# Patient Record
Sex: FEMALE | Race: WHITE | HISPANIC OR LATINO | Employment: OTHER | URBAN - METROPOLITAN AREA
[De-identification: names, ages, dates, MRNs, and addresses within clinical notes are randomized per-mention and may not be internally consistent; named-entity substitution may affect disease eponyms.]

---

## 2017-04-04 ENCOUNTER — GENERIC CONVERSION - ENCOUNTER (OUTPATIENT)
Dept: OTHER | Facility: OTHER | Age: 47
End: 2017-04-04

## 2017-05-03 ENCOUNTER — ALLSCRIPTS OFFICE VISIT (OUTPATIENT)
Dept: OTHER | Facility: OTHER | Age: 47
End: 2017-05-03

## 2017-05-03 DIAGNOSIS — R10.11 RIGHT UPPER QUADRANT PAIN: ICD-10-CM

## 2017-05-03 DIAGNOSIS — Z12.31 ENCOUNTER FOR SCREENING MAMMOGRAM FOR MALIGNANT NEOPLASM OF BREAST: ICD-10-CM

## 2017-05-10 ENCOUNTER — APPOINTMENT (OUTPATIENT)
Dept: LAB | Facility: CLINIC | Age: 47
End: 2017-05-10

## 2017-05-10 DIAGNOSIS — R10.11 RIGHT UPPER QUADRANT PAIN: ICD-10-CM

## 2017-05-10 LAB
ALBUMIN SERPL BCP-MCNC: 3.7 G/DL (ref 3.5–5)
ALP SERPL-CCNC: 65 U/L (ref 46–116)
ALT SERPL W P-5'-P-CCNC: 19 U/L (ref 12–78)
ANION GAP SERPL CALCULATED.3IONS-SCNC: 8 MMOL/L (ref 4–13)
AST SERPL W P-5'-P-CCNC: 13 U/L (ref 5–45)
BILIRUB SERPL-MCNC: 0.37 MG/DL (ref 0.2–1)
BUN SERPL-MCNC: 12 MG/DL (ref 5–25)
CALCIUM SERPL-MCNC: 8.7 MG/DL (ref 8.3–10.1)
CHLORIDE SERPL-SCNC: 108 MMOL/L (ref 100–108)
CO2 SERPL-SCNC: 24 MMOL/L (ref 21–32)
CREAT SERPL-MCNC: 0.61 MG/DL (ref 0.6–1.3)
GFR SERPL CREATININE-BSD FRML MDRD: >60 ML/MIN/1.73SQ M
GLUCOSE P FAST SERPL-MCNC: 75 MG/DL (ref 65–99)
POTASSIUM SERPL-SCNC: 4 MMOL/L (ref 3.5–5.3)
PROT SERPL-MCNC: 6.5 G/DL (ref 6.4–8.2)
SODIUM SERPL-SCNC: 140 MMOL/L (ref 136–145)

## 2017-05-10 PROCEDURE — 36415 COLL VENOUS BLD VENIPUNCTURE: CPT

## 2017-05-10 PROCEDURE — 80053 COMPREHEN METABOLIC PANEL: CPT

## 2017-05-15 ENCOUNTER — HOSPITAL ENCOUNTER (OUTPATIENT)
Dept: RADIOLOGY | Facility: HOSPITAL | Age: 47
Discharge: HOME | End: 2017-05-15

## 2017-05-15 ENCOUNTER — TRANSCRIBE ORDERS (OUTPATIENT)
Dept: ADMINISTRATIVE | Facility: HOSPITAL | Age: 47
End: 2017-05-15

## 2017-05-15 DIAGNOSIS — R10.11 RIGHT UPPER QUADRANT PAIN: ICD-10-CM

## 2017-05-15 DIAGNOSIS — Z12.31 VISIT FOR SCREENING MAMMOGRAM: Primary | ICD-10-CM

## 2017-05-15 PROCEDURE — 76705 ECHO EXAM OF ABDOMEN: CPT

## 2017-05-22 ENCOUNTER — HOSPITAL ENCOUNTER (OUTPATIENT)
Dept: MAMMOGRAPHY | Facility: HOSPITAL | Age: 47
Discharge: HOME | End: 2017-05-22

## 2017-05-22 DIAGNOSIS — Z12.31 ENCOUNTER FOR SCREENING MAMMOGRAM FOR MALIGNANT NEOPLASM OF BREAST: ICD-10-CM

## 2017-05-22 PROCEDURE — G0202 SCR MAMMO BI INCL CAD: HCPCS

## 2017-06-06 ENCOUNTER — ALLSCRIPTS OFFICE VISIT (OUTPATIENT)
Dept: OTHER | Facility: OTHER | Age: 47
End: 2017-06-06

## 2017-06-23 ENCOUNTER — TRANSCRIBE ORDERS (OUTPATIENT)
Dept: ADMINISTRATIVE | Facility: HOSPITAL | Age: 47
End: 2017-06-23

## 2017-06-23 DIAGNOSIS — N64.4 BREAST PAIN: Primary | ICD-10-CM

## 2017-07-01 ENCOUNTER — HOSPITAL ENCOUNTER (OUTPATIENT)
Dept: RADIOLOGY | Facility: HOSPITAL | Age: 47
Discharge: HOME | End: 2017-07-01

## 2017-07-01 DIAGNOSIS — N64.4 BREAST PAIN: ICD-10-CM

## 2017-07-01 PROCEDURE — A9585 GADOBUTROL INJECTION: HCPCS | Performed by: RADIOLOGY

## 2017-07-01 PROCEDURE — C8908 MRI W/O FOL W/CONT, BREAST,: HCPCS

## 2017-07-01 PROCEDURE — 0159T HB CAD BREAST MRI: CPT

## 2017-07-01 RX ADMIN — GADOBUTROL 5 ML: 604.72 INJECTION INTRAVENOUS at 14:14

## 2017-07-03 DIAGNOSIS — R59.0 LOCALIZED ENLARGED LYMPH NODES: ICD-10-CM

## 2017-07-03 DIAGNOSIS — D24.9 BENIGN NEOPLASM OF BREAST: ICD-10-CM

## 2017-07-03 DIAGNOSIS — R92.8 OTHER ABNORMAL AND INCONCLUSIVE FINDINGS ON DIAGNOSTIC IMAGING OF BREAST: ICD-10-CM

## 2017-07-13 ENCOUNTER — ALLSCRIPTS OFFICE VISIT (OUTPATIENT)
Dept: OTHER | Facility: OTHER | Age: 47
End: 2017-07-13

## 2017-07-19 ENCOUNTER — HOSPITAL ENCOUNTER (OUTPATIENT)
Dept: ULTRASOUND IMAGING | Facility: CLINIC | Age: 47
Discharge: HOME | End: 2017-07-19

## 2017-07-19 ENCOUNTER — HOSPITAL ENCOUNTER (OUTPATIENT)
Dept: MAMMOGRAPHY | Facility: CLINIC | Age: 47
Discharge: HOME | End: 2017-07-19
Payer: COMMERCIAL

## 2017-07-19 ENCOUNTER — HOSPITAL ENCOUNTER (OUTPATIENT)
Dept: ULTRASOUND IMAGING | Facility: CLINIC | Age: 47
Discharge: HOME | End: 2017-07-19
Admitting: RADIOLOGY

## 2017-07-19 ENCOUNTER — HOSPITAL ENCOUNTER (OUTPATIENT)
Dept: ULTRASOUND IMAGING | Facility: CLINIC | Age: 47
Discharge: HOME | End: 2017-07-19
Payer: COMMERCIAL

## 2017-07-19 VITALS — DIASTOLIC BLOOD PRESSURE: 64 MMHG | HEART RATE: 68 BPM | SYSTOLIC BLOOD PRESSURE: 110 MMHG

## 2017-07-19 DIAGNOSIS — R92.8 ABNORMAL ULTRASOUND OF BREAST: ICD-10-CM

## 2017-07-19 DIAGNOSIS — R92.8 OTHER ABNORMAL AND INCONCLUSIVE FINDINGS ON DIAGNOSTIC IMAGING OF BREAST: ICD-10-CM

## 2017-07-19 PROCEDURE — 19084 BX BREAST ADD LESION US IMAG: CPT

## 2017-07-19 PROCEDURE — 76642 ULTRASOUND BREAST LIMITED: CPT

## 2017-07-19 PROCEDURE — 88305 TISSUE EXAM BY PATHOLOGIST: CPT | Performed by: PHYSICIAN ASSISTANT

## 2017-07-19 PROCEDURE — 19083 BX BREAST 1ST LESION US IMAG: CPT

## 2017-07-19 RX ORDER — LIDOCAINE HYDROCHLORIDE 10 MG/ML
4 INJECTION, SOLUTION INFILTRATION; PERINEURAL ONCE
Status: COMPLETED | OUTPATIENT
Start: 2017-07-19 | End: 2017-07-19

## 2017-07-19 RX ADMIN — LIDOCAINE HYDROCHLORIDE 4 ML: 10 INJECTION, SOLUTION INFILTRATION; PERINEURAL at 12:31

## 2017-07-19 RX ADMIN — LIDOCAINE HYDROCHLORIDE 4 ML: 10 INJECTION, SOLUTION INFILTRATION; PERINEURAL at 12:26

## 2017-07-26 ENCOUNTER — ALLSCRIPTS OFFICE VISIT (OUTPATIENT)
Dept: OTHER | Facility: OTHER | Age: 47
End: 2017-07-26

## 2017-07-28 ENCOUNTER — ALLSCRIPTS OFFICE VISIT (OUTPATIENT)
Dept: OTHER | Facility: OTHER | Age: 47
End: 2017-07-28

## 2017-07-31 ENCOUNTER — ALLSCRIPTS OFFICE VISIT (OUTPATIENT)
Dept: OTHER | Facility: OTHER | Age: 47
End: 2017-07-31

## 2017-08-03 ENCOUNTER — HOSPITAL ENCOUNTER (OUTPATIENT)
Dept: RADIOLOGY | Facility: HOSPITAL | Age: 47
Discharge: HOME | End: 2017-08-03

## 2017-08-03 DIAGNOSIS — R59.0 LOCALIZED ENLARGED LYMPH NODES: ICD-10-CM

## 2017-08-03 PROCEDURE — 76536 US EXAM OF HEAD AND NECK: CPT

## 2017-09-27 ENCOUNTER — GENERIC CONVERSION - ENCOUNTER (OUTPATIENT)
Dept: OTHER | Facility: OTHER | Age: 47
End: 2017-09-27

## 2017-10-16 ENCOUNTER — GENERIC CONVERSION - ENCOUNTER (OUTPATIENT)
Dept: OTHER | Facility: OTHER | Age: 47
End: 2017-10-16

## 2017-10-16 DIAGNOSIS — N64.4 MASTODYNIA: ICD-10-CM

## 2017-10-24 ENCOUNTER — HOSPITAL ENCOUNTER (OUTPATIENT)
Dept: ULTRASOUND IMAGING | Facility: CLINIC | Age: 47
Discharge: HOME | End: 2017-10-24
Payer: COMMERCIAL

## 2017-10-24 ENCOUNTER — HOSPITAL ENCOUNTER (OUTPATIENT)
Dept: MAMMOGRAPHY | Facility: CLINIC | Age: 47
Discharge: HOME | End: 2017-10-24
Payer: COMMERCIAL

## 2017-10-24 DIAGNOSIS — N64.4 MASTODYNIA: ICD-10-CM

## 2017-10-24 PROCEDURE — 76642 ULTRASOUND BREAST LIMITED: CPT

## 2017-10-24 PROCEDURE — G0206 DX MAMMO INCL CAD UNI: HCPCS

## 2017-10-24 PROCEDURE — G0279 TOMOSYNTHESIS, MAMMO: HCPCS

## 2018-01-11 NOTE — CONSULTS
Assessment    1  Fibroadenoma of breast (217) (D24 9)    47yoF with Left upper, medial quadrant breast fibroadenoma x2  Seen on MRI in 7/2017 and core biopsied in 7/2017 with pathology showing fibroadenoma  Discussed with patient that it is not necessary to remove all biopsy proven fibroadenomas  Since hers is asymptomatic we will elect to follow with exams and imaging  Indications to remove would be growth or symptomatology  We will repeat mammogram in 6 months  if increases in size will offer excision to rule out dysplasia  Plan  Cervical lymphadenopathy    · US HEAD NECK SOFT TISSUE; Status:Active; Requested for:[id number];    Perform:[facility]; IVL:[id number]; Last Updated Daryl Bumpers; 7/27/2017 6:30:37 AM;Ordered; For:Cervical lymphadenopathy; Ordered By:Herminia Mccallum;  Fibroadenoma of breast    · * MAMMO SCREENING BILATERAL W CAD; Status:Hold For - Scheduling; Requested  for:97Lsq5584;    Perform:[facility]; Due:92Vbz6777; Ordered; For:Fibroadenoma of breast; Ordered By:Blake Cesar;    1   repeat mammogram in 6 months     Chief Complaint  Chief Complaint Free Text Note Form: "I need surgery"      History of Present Illness  HPI: 55yoF with significant history of breast cancer in immediate family including sister at age 32 and other sister in her 46s  she had screening mammogram in 5/2017 which was limited 2/2 to increased breast density  She underwent MRI which showed 2 Left medial, upper breast nodules  These was core biopsied in 7/2017 and results were fibrodadenoma which was concordant with radiographic features  She is not on estrogen therapy She has had tubal ligation but still has ovaries  has not yet reached menopause  she is asymptomatic from firbroadenoma standpoint  Hospital Based Practices Required Assessment:   Pain Assessment   the patient states they do not have pain   (on a scale of 0 to 10, the patient rates the pain at 0 )    Prefered Language is ENGLISH  Primary Language is  [place]  Active Problems    1  Abnormal findings on diagnostic imaging of breast (793 89) (R92 8)   2  Cervical lymphadenopathy (785 6) (R59 0)   3  Dyspepsia (536 8) (R10 13)   4  Fibroadenoma of breast (217) (D24 9)   5  H/O breast biopsy (V45 89) (Z98 890)   6  Lesion of oral mucosa (528 9) (K13 70)   7  Need for prophylactic vaccination and inoculation against influenza (V04 81) (Z23)   8  History of Oral contraceptive prescribed (V25 01) (Z30 011)   9  Post-nasal drip (784 91) (R09 82)   10  History of Sexually Active, Contraception Desired    Past Medical History    1  History of Abdominal discomfort (789 00) (R10 9)   2  History of Acute pain of right knee (719 46) (M25 561)   3  History of Age At First Period 15 Years Old (Menarche)   4  History of Cough with hemoptysis (786 39) (R04 2)   5  History of Dyspepsia (536 8) (K30)   6  History of Encounter for routine gynecological examination (V72 31) (Z01 419)   7  History of Encounter for screening mammogram for malignant neoplasm of breast   (V76 12) (Z12 31)   8  History of anxiety disorder (V11 8) (Z86 59)   9  History of chronic pharyngitis (V12 69) (Z87 09)   10  History of diarrhea (V12 79) (Z87 898)   11  History of fatigue (V13 89) (Z87 898)   12  History of ovarian cyst (V13 29) (Z87 42)   13  History of Mammogram abnormal (793 80) (R92 8)   14  History of Need for Tdap vaccination (V06 1) (Z23)   15  History of Oral contraceptive prescribed (V25 01) (Z30 011)   16  History of Pain of sternum (786 50) (R07 89)   17  History of Postnasal drip (784 91) (R09 82)   18  History of Previously Pregnant With 2  Normal Vaginal Deliveries   19  History of Right upper quadrant abdominal pain (789 01) (R10 11)   20  History of Visit for screening mammogram (V76 12) (Z12 31)   21  History of Weight loss (783 21) (R63 4)  Active Problems And Past Medical History Reviewed:    The active problems and past medical history were reviewed and updated today  Surgical History    1  History of Laparoscopy With Salpingostomy    Family History  Mother    1  Family history of Hypertension   2  Family history of Thyroid trouble  Sister    3  Family history of Breast Cancer (V16 3)   4  Family history of Thyroid trouble  Family History    5  Family history of Hypertension (V17 49)    Social History    ·    · Never A Smoker   · No alcohol use   · History of Sexually Active, Contraception Desired  Social History Reviewed: The social history was reviewed and updated today  Current Meds   1  Omeprazole 20 MG Oral Capsule Delayed Release; take 1 capsule by mouth once daily; Therapy: 62UHR8154 to (Evaluate:02Jun2017)  Requested for: 15PBF3903; Last   Rx:52Lrj6595 Ordered   2  RaNITidine HCl - 150 MG Oral Tablet; TAKE 1 TABLET Once PRN severe stomach pain   not to exceed 2 pills a week; Therapy: 87IPB7490 to (Evaluate:84Pxe0502)  Requested for: 16OIG7156; Last   Rx:06Jun2017 Ordered   3  Saline Mist Spray 0 65 % Nasal Solution; USE 1 SPRAY IN EACH NOSTRIL TWICE   DAILY; Therapy: 26PGJ4423 to (Last ZC:60BPI7629)  Requested for: 37FJA6189 Ordered   4  Sprintec 28 0 25-35 MG-MCG Oral Tablet; TAKE 1 TABLET DAILY; Therapy: 93BAK1008 to (Inés Chaves)  Requested for: 83HJO0379; Last   Rx:03Nov2015 Ordered    Allergies    1  No Known Drug Allergies    Vitals  Vital Signs    Recorded: 96LNP0005 11:01AM   Temperature 98 2 F   Heart Rate 72   Systolic 92   Diastolic 60   Height 5 ft 3 in   Weight 127 lb 6 80 oz   BMI Calculated 22 57   BSA Calculated 1 6     Physical Exam    Additional Exam:  no palpable abnormalities of L breast  no palapble adenopathy of L axilla  soemwaht tender in L brest, upper inner quadrant  Signatures   Electronically signed by :  Nikki Curtis MD; Jul 31 2017 11:47AM EST                       (Author)

## 2018-01-12 VITALS
SYSTOLIC BLOOD PRESSURE: 98 MMHG | TEMPERATURE: 98.3 F | BODY MASS INDEX: 23.29 KG/M2 | WEIGHT: 126.54 LBS | DIASTOLIC BLOOD PRESSURE: 60 MMHG | HEART RATE: 70 BPM | HEIGHT: 62 IN

## 2018-01-13 VITALS
HEART RATE: 72 BPM | DIASTOLIC BLOOD PRESSURE: 50 MMHG | TEMPERATURE: 98.4 F | HEIGHT: 63 IN | WEIGHT: 129.63 LBS | SYSTOLIC BLOOD PRESSURE: 100 MMHG | BODY MASS INDEX: 22.97 KG/M2

## 2018-01-13 VITALS
HEIGHT: 63 IN | WEIGHT: 127.43 LBS | DIASTOLIC BLOOD PRESSURE: 60 MMHG | TEMPERATURE: 98.2 F | BODY MASS INDEX: 22.58 KG/M2 | SYSTOLIC BLOOD PRESSURE: 92 MMHG | HEART RATE: 72 BPM

## 2018-01-14 VITALS
HEIGHT: 63 IN | DIASTOLIC BLOOD PRESSURE: 60 MMHG | SYSTOLIC BLOOD PRESSURE: 106 MMHG | HEART RATE: 64 BPM | TEMPERATURE: 98 F | WEIGHT: 125.66 LBS | BODY MASS INDEX: 22.27 KG/M2

## 2018-01-14 VITALS
SYSTOLIC BLOOD PRESSURE: 116 MMHG | HEART RATE: 64 BPM | DIASTOLIC BLOOD PRESSURE: 72 MMHG | TEMPERATURE: 98.5 F | HEIGHT: 63 IN | BODY MASS INDEX: 22.34 KG/M2 | WEIGHT: 126.1 LBS

## 2018-01-22 VITALS
SYSTOLIC BLOOD PRESSURE: 84 MMHG | HEART RATE: 84 BPM | WEIGHT: 125.66 LBS | HEIGHT: 63 IN | DIASTOLIC BLOOD PRESSURE: 62 MMHG | TEMPERATURE: 98.3 F | BODY MASS INDEX: 22.27 KG/M2

## 2018-01-22 VITALS
HEART RATE: 88 BPM | WEIGHT: 125.66 LBS | SYSTOLIC BLOOD PRESSURE: 106 MMHG | HEIGHT: 63 IN | TEMPERATURE: 98.5 F | DIASTOLIC BLOOD PRESSURE: 70 MMHG | BODY MASS INDEX: 22.27 KG/M2

## 2025-06-06 ENCOUNTER — APPOINTMENT (EMERGENCY)
Dept: CT IMAGING | Facility: HOSPITAL | Age: 55
End: 2025-06-06
Payer: COMMERCIAL

## 2025-06-06 ENCOUNTER — HOSPITAL ENCOUNTER (EMERGENCY)
Facility: HOSPITAL | Age: 55
Discharge: HOME | End: 2025-06-06
Attending: EMERGENCY MEDICINE
Payer: COMMERCIAL

## 2025-06-06 VITALS
RESPIRATION RATE: 16 BRPM | DIASTOLIC BLOOD PRESSURE: 70 MMHG | WEIGHT: 145.5 LBS | SYSTOLIC BLOOD PRESSURE: 149 MMHG | HEART RATE: 72 BPM | BODY MASS INDEX: 25.78 KG/M2 | HEIGHT: 63 IN | TEMPERATURE: 98 F | OXYGEN SATURATION: 99 %
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
## 2025-06-06 DIAGNOSIS — S16.1XXA STRAIN OF NECK MUSCLE, INITIAL ENCOUNTER: ICD-10-CM

## 2025-06-06 PROCEDURE — 99284 EMERGENCY DEPT VISIT MOD MDM: CPT | Performed by: EMERGENCY MEDICINE

## 2025-06-06 PROCEDURE — 72125 CT NECK SPINE W/O DYE: CPT

## 2025-06-06 PROCEDURE — 99284 EMERGENCY DEPT VISIT MOD MDM: CPT

## 2025-06-06 RX ORDER — MULTIVIT WITH MINERALS/LUTEIN
1000 TABLET ORAL DAILY
COMMUNITY

## 2025-06-06 RX ORDER — DIPHENOXYLATE HYDROCHLORIDE AND ATROPINE SULFATE 2.5; .025 MG/1; MG/1
1 TABLET ORAL EVERY MORNING
COMMUNITY

## 2025-06-06 RX ORDER — OMEPRAZOLE 20 MG/1
20 CAPSULE, DELAYED RELEASE ORAL DAILY
COMMUNITY

## 2025-06-06 RX ORDER — IBUPROFEN 400 MG/1
400 TABLET, FILM COATED ORAL ONCE
Status: COMPLETED | OUTPATIENT
Start: 2025-06-06 | End: 2025-06-06

## 2025-06-06 RX ADMIN — IBUPROFEN 400 MG: 400 TABLET ORAL at 17:51
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
